# Patient Record
Sex: FEMALE | Race: WHITE
[De-identification: names, ages, dates, MRNs, and addresses within clinical notes are randomized per-mention and may not be internally consistent; named-entity substitution may affect disease eponyms.]

---

## 2017-09-18 ENCOUNTER — HOSPITAL ENCOUNTER (OUTPATIENT)
Dept: HOSPITAL 15 - LAB | Age: 82
Discharge: HOME | End: 2017-09-18
Attending: INTERNAL MEDICINE
Payer: MEDICARE

## 2017-09-18 DIAGNOSIS — I50.9: ICD-10-CM

## 2017-09-18 DIAGNOSIS — E11.8: Primary | ICD-10-CM

## 2017-09-18 DIAGNOSIS — I11.0: ICD-10-CM

## 2017-09-18 LAB
ALBUMIN SERPL-MCNC: 3.2 G/DL (ref 3.4–5)
ALP SERPL-CCNC: 90 U/L (ref 45–117)
ANION GAP SERPL CALCULATED.3IONS-SCNC: 7 MMOL/L (ref 5–15)
BILIRUB SERPL-MCNC: 0.4 MG/DL (ref 0.2–1)
BUN SERPL-MCNC: 19 MG/DL (ref 7–18)
BUN/CREAT SERPL: 18.1
CALCIUM SERPL-MCNC: 8.9 MG/DL (ref 8.5–10.1)
CHLORIDE SERPL-SCNC: 103 MMOL/L (ref 98–107)
CHOLEST SERPL-MCNC: 85 MG/DL (ref ?–200)
CO2 SERPL-SCNC: 29 MMOL/L (ref 21–32)
GLUCOSE SERPL-MCNC: 144 MG/DL (ref 74–106)
HCT VFR BLD AUTO: 35.9 % (ref 36–46)
HDLC SERPL-MCNC: 51 MG/DL (ref 40–59)
HGB BLD-MCNC: 12 G/DL (ref 12.2–16.2)
MCH RBC QN AUTO: 28.1 PG (ref 28–32)
MCV RBC AUTO: 84.3 FL (ref 80–100)
NRBC BLD QL AUTO: 0 %
POTASSIUM SERPL-SCNC: 3.5 MMOL/L (ref 3.5–5.1)
PROT SERPL-MCNC: 7.5 G/DL (ref 6.4–8.2)
SODIUM SERPL-SCNC: 139 MMOL/L (ref 136–145)
TRIGL SERPL-MCNC: 126 MG/DL (ref ?–150)

## 2017-09-18 PROCEDURE — 80061 LIPID PANEL: CPT

## 2017-09-18 PROCEDURE — 80053 COMPREHEN METABOLIC PANEL: CPT

## 2017-09-18 PROCEDURE — 83036 HEMOGLOBIN GLYCOSYLATED A1C: CPT

## 2017-09-18 PROCEDURE — 82043 UR ALBUMIN QUANTITATIVE: CPT

## 2017-09-18 PROCEDURE — 36415 COLL VENOUS BLD VENIPUNCTURE: CPT

## 2017-09-18 PROCEDURE — 81001 URINALYSIS AUTO W/SCOPE: CPT

## 2017-09-18 PROCEDURE — 85025 COMPLETE CBC W/AUTO DIFF WBC: CPT

## 2018-04-18 ENCOUNTER — HOSPITAL ENCOUNTER (OUTPATIENT)
Dept: HOSPITAL 15 - LAB | Age: 83
Discharge: HOME | End: 2018-04-18
Attending: INTERNAL MEDICINE
Payer: MEDICARE

## 2018-04-18 DIAGNOSIS — E11.22: ICD-10-CM

## 2018-04-18 DIAGNOSIS — I12.9: ICD-10-CM

## 2018-04-18 DIAGNOSIS — N18.3: ICD-10-CM

## 2018-04-18 DIAGNOSIS — Z79.899: ICD-10-CM

## 2018-04-18 DIAGNOSIS — E78.5: Primary | ICD-10-CM

## 2018-04-18 LAB
ALBUMIN SERPL-MCNC: 3.2 G/DL (ref 3.4–5)
ALP SERPL-CCNC: 96 U/L (ref 45–117)
ALT SERPL-CCNC: 19 U/L (ref 13–56)
ANION GAP SERPL CALCULATED.3IONS-SCNC: 9 MMOL/L (ref 5–15)
BILIRUB SERPL-MCNC: 0.2 MG/DL (ref 0.2–1)
BUN SERPL-MCNC: 32 MG/DL (ref 7–18)
BUN/CREAT SERPL: 26.9
CALCIUM SERPL-MCNC: 9.3 MG/DL (ref 8.5–10.1)
CHLORIDE SERPL-SCNC: 107 MMOL/L (ref 98–107)
CHOLEST SERPL-MCNC: 134 MG/DL (ref ?–200)
CO2 SERPL-SCNC: 24 MMOL/L (ref 21–32)
GLUCOSE SERPL-MCNC: 131 MG/DL (ref 74–106)
HCT VFR BLD AUTO: 35 % (ref 36–46)
HDLC SERPL-MCNC: 41 MG/DL (ref 40–59)
HGB BLD-MCNC: 11.6 G/DL (ref 12.2–16.2)
MCH RBC QN AUTO: 28.6 PG (ref 28–32)
MCV RBC AUTO: 86.3 FL (ref 80–100)
NRBC BLD QL AUTO: 0 %
POTASSIUM SERPL-SCNC: 3.6 MMOL/L (ref 3.5–5.1)
PROT SERPL-MCNC: 7.5 G/DL (ref 6.4–8.2)
SODIUM SERPL-SCNC: 140 MMOL/L (ref 136–145)
TRIGL SERPL-MCNC: 242 MG/DL (ref ?–150)

## 2018-04-18 PROCEDURE — 84443 ASSAY THYROID STIM HORMONE: CPT

## 2018-04-18 PROCEDURE — 80053 COMPREHEN METABOLIC PANEL: CPT

## 2018-04-18 PROCEDURE — 80061 LIPID PANEL: CPT

## 2018-04-18 PROCEDURE — 82306 VITAMIN D 25 HYDROXY: CPT

## 2018-04-18 PROCEDURE — 83036 HEMOGLOBIN GLYCOSYLATED A1C: CPT

## 2018-04-18 PROCEDURE — 85025 COMPLETE CBC W/AUTO DIFF WBC: CPT

## 2018-04-18 PROCEDURE — 36415 COLL VENOUS BLD VENIPUNCTURE: CPT

## 2018-04-18 PROCEDURE — 81001 URINALYSIS AUTO W/SCOPE: CPT

## 2019-07-17 ENCOUNTER — HOSPITAL ENCOUNTER (OUTPATIENT)
Dept: HOSPITAL 15 - LAB | Age: 84
Discharge: HOME | End: 2019-07-17
Attending: NURSE PRACTITIONER
Payer: MEDICARE

## 2019-07-17 DIAGNOSIS — E11.29: ICD-10-CM

## 2019-07-17 DIAGNOSIS — E78.5: Primary | ICD-10-CM

## 2019-07-17 DIAGNOSIS — I50.9: ICD-10-CM

## 2019-07-17 DIAGNOSIS — I11.0: ICD-10-CM

## 2019-07-17 LAB
ALBUMIN SERPL-MCNC: 3.2 G/DL (ref 3.4–5)
ALP SERPL-CCNC: 83 U/L (ref 45–117)
ALT SERPL-CCNC: 16 U/L (ref 13–56)
ANION GAP SERPL CALCULATED.3IONS-SCNC: 15 MMOL/L (ref 5–15)
BILIRUB SERPL-MCNC: 0.3 MG/DL (ref 0.2–1)
BUN SERPL-MCNC: 24 MG/DL (ref 7–18)
BUN/CREAT SERPL: 19.2
CALCIUM SERPL-MCNC: 7.9 MG/DL (ref 8.5–10.1)
CHLORIDE SERPL-SCNC: 110 MMOL/L (ref 98–107)
CHOLEST SERPL-MCNC: 77 MG/DL (ref ?–200)
CO2 SERPL-SCNC: 16 MMOL/L (ref 21–32)
GLUCOSE SERPL-MCNC: 150 MG/DL (ref 74–106)
HCT VFR BLD AUTO: 35.7 % (ref 36–46)
HDLC SERPL-MCNC: 46 MG/DL (ref 40–59)
HGB BLD-MCNC: 11.9 G/DL (ref 12.2–16.2)
MCH RBC QN AUTO: 28.3 PG (ref 28–32)
MCV RBC AUTO: 84.7 FL (ref 80–100)
NRBC BLD QL AUTO: 0 %
POTASSIUM SERPL-SCNC: 4 MMOL/L (ref 3.5–5.1)
PROT SERPL-MCNC: 7.5 G/DL (ref 6.4–8.2)
SODIUM SERPL-SCNC: 141 MMOL/L (ref 136–145)
TRIGL SERPL-MCNC: 133 MG/DL (ref ?–150)

## 2019-07-17 PROCEDURE — 85025 COMPLETE CBC W/AUTO DIFF WBC: CPT

## 2019-07-17 PROCEDURE — 81001 URINALYSIS AUTO W/SCOPE: CPT

## 2019-07-17 PROCEDURE — 84443 ASSAY THYROID STIM HORMONE: CPT

## 2019-07-17 PROCEDURE — 80053 COMPREHEN METABOLIC PANEL: CPT

## 2019-07-17 PROCEDURE — 36415 COLL VENOUS BLD VENIPUNCTURE: CPT

## 2019-07-17 PROCEDURE — 83036 HEMOGLOBIN GLYCOSYLATED A1C: CPT

## 2019-07-17 PROCEDURE — 82043 UR ALBUMIN QUANTITATIVE: CPT

## 2019-07-17 PROCEDURE — 80061 LIPID PANEL: CPT

## 2020-06-23 ENCOUNTER — HOSPITAL ENCOUNTER (INPATIENT)
Dept: HOSPITAL 15 - ER | Age: 85
LOS: 7 days | Discharge: HOME | DRG: 853 | End: 2020-06-30
Attending: FAMILY MEDICINE | Admitting: NURSE PRACTITIONER
Payer: MEDICARE

## 2020-06-23 VITALS — SYSTOLIC BLOOD PRESSURE: 110 MMHG | DIASTOLIC BLOOD PRESSURE: 62 MMHG

## 2020-06-23 VITALS — SYSTOLIC BLOOD PRESSURE: 128 MMHG | DIASTOLIC BLOOD PRESSURE: 56 MMHG

## 2020-06-23 VITALS — WEIGHT: 203.49 LBS | BODY MASS INDEX: 33.9 KG/M2 | HEIGHT: 65 IN

## 2020-06-23 VITALS — SYSTOLIC BLOOD PRESSURE: 145 MMHG | DIASTOLIC BLOOD PRESSURE: 62 MMHG

## 2020-06-23 DIAGNOSIS — N18.3: ICD-10-CM

## 2020-06-23 DIAGNOSIS — Z90.710: ICD-10-CM

## 2020-06-23 DIAGNOSIS — R65.20: ICD-10-CM

## 2020-06-23 DIAGNOSIS — E66.9: ICD-10-CM

## 2020-06-23 DIAGNOSIS — J18.9: ICD-10-CM

## 2020-06-23 DIAGNOSIS — E86.0: ICD-10-CM

## 2020-06-23 DIAGNOSIS — E11.22: ICD-10-CM

## 2020-06-23 DIAGNOSIS — K80.00: ICD-10-CM

## 2020-06-23 DIAGNOSIS — Z79.84: ICD-10-CM

## 2020-06-23 DIAGNOSIS — Z85.068: ICD-10-CM

## 2020-06-23 DIAGNOSIS — Z80.9: ICD-10-CM

## 2020-06-23 DIAGNOSIS — Z03.818: ICD-10-CM

## 2020-06-23 DIAGNOSIS — A41.59: Primary | ICD-10-CM

## 2020-06-23 DIAGNOSIS — I50.33: ICD-10-CM

## 2020-06-23 DIAGNOSIS — I13.0: ICD-10-CM

## 2020-06-23 DIAGNOSIS — Z85.028: ICD-10-CM

## 2020-06-23 DIAGNOSIS — E78.5: ICD-10-CM

## 2020-06-23 DIAGNOSIS — N17.0: ICD-10-CM

## 2020-06-23 DIAGNOSIS — B96.1: ICD-10-CM

## 2020-06-23 DIAGNOSIS — R19.7: ICD-10-CM

## 2020-06-23 LAB
ALBUMIN SERPL-MCNC: 3.7 G/DL (ref 3.4–5)
ALP SERPL-CCNC: 137 U/L (ref 45–117)
ALT SERPL-CCNC: 187 U/L (ref 13–56)
ANION GAP SERPL CALCULATED.3IONS-SCNC: 14 MMOL/L (ref 5–15)
BILIRUB SERPL-MCNC: 2.7 MG/DL (ref 0.2–1)
BUN SERPL-MCNC: 30 MG/DL (ref 7–18)
BUN/CREAT SERPL: 12.7
CALCIUM SERPL-MCNC: 9.5 MG/DL (ref 8.5–10.1)
CHLORIDE SERPL-SCNC: 103 MMOL/L (ref 98–107)
CO2 SERPL-SCNC: 21 MMOL/L (ref 21–32)
CRYSTALS UR QL AUTO: (no result) /HPF
GLUCOSE SERPL-MCNC: 315 MG/DL (ref 74–106)
HCT VFR BLD AUTO: 41.3 % (ref 36–46)
HGB BLD-MCNC: 13.5 G/DL (ref 12.2–16.2)
LACTATE PLASV-SCNC: 7.6 MMOL/L (ref 0.4–2)
MAGNESIUM SERPL-MCNC: 2.1 MG/DL (ref 1.6–2.6)
MCH RBC QN AUTO: 29.4 PG (ref 28–32)
MCV RBC AUTO: 89.8 FL (ref 80–100)
NRBC BLD QL AUTO: 0 %
POTASSIUM SERPL-SCNC: 3.7 MMOL/L (ref 3.5–5.1)
PROT SERPL-MCNC: 7.8 G/DL (ref 6.4–8.2)
SODIUM SERPL-SCNC: 138 MMOL/L (ref 136–145)

## 2020-06-23 PROCEDURE — 87804 INFLUENZA ASSAY W/OPTIC: CPT

## 2020-06-23 PROCEDURE — 83735 ASSAY OF MAGNESIUM: CPT

## 2020-06-23 PROCEDURE — 86901 BLOOD TYPING SEROLOGIC RH(D): CPT

## 2020-06-23 PROCEDURE — 36415 COLL VENOUS BLD VENIPUNCTURE: CPT

## 2020-06-23 PROCEDURE — 87070 CULTURE OTHR SPECIMN AEROBIC: CPT

## 2020-06-23 PROCEDURE — 83036 HEMOGLOBIN GLYCOSYLATED A1C: CPT

## 2020-06-23 PROCEDURE — 86850 RBC ANTIBODY SCREEN: CPT

## 2020-06-23 PROCEDURE — 82247 BILIRUBIN TOTAL: CPT

## 2020-06-23 PROCEDURE — 87040 BLOOD CULTURE FOR BACTERIA: CPT

## 2020-06-23 PROCEDURE — 76700 US EXAM ABDOM COMPLETE: CPT

## 2020-06-23 PROCEDURE — 87045 FECES CULTURE AEROBIC BACT: CPT

## 2020-06-23 PROCEDURE — 82962 GLUCOSE BLOOD TEST: CPT

## 2020-06-23 PROCEDURE — 86704 HEP B CORE ANTIBODY TOTAL: CPT

## 2020-06-23 PROCEDURE — 93970 EXTREMITY STUDY: CPT

## 2020-06-23 PROCEDURE — 86900 BLOOD TYPING SEROLOGIC ABO: CPT

## 2020-06-23 PROCEDURE — 96375 TX/PRO/DX INJ NEW DRUG ADDON: CPT

## 2020-06-23 PROCEDURE — 81001 URINALYSIS AUTO W/SCOPE: CPT

## 2020-06-23 PROCEDURE — 83970 ASSAY OF PARATHORMONE: CPT

## 2020-06-23 PROCEDURE — 85379 FIBRIN DEGRADATION QUANT: CPT

## 2020-06-23 PROCEDURE — 94002 VENT MGMT INPAT INIT DAY: CPT

## 2020-06-23 PROCEDURE — 85730 THROMBOPLASTIN TIME PARTIAL: CPT

## 2020-06-23 PROCEDURE — 82728 ASSAY OF FERRITIN: CPT

## 2020-06-23 PROCEDURE — 78582 LUNG VENTILAT&PERFUS IMAGING: CPT

## 2020-06-23 PROCEDURE — 87880 STREP A ASSAY W/OPTIC: CPT

## 2020-06-23 PROCEDURE — 87186 SC STD MICRODIL/AGAR DIL: CPT

## 2020-06-23 PROCEDURE — 87493 C DIFF AMPLIFIED PROBE: CPT

## 2020-06-23 PROCEDURE — 87077 CULTURE AEROBIC IDENTIFY: CPT

## 2020-06-23 PROCEDURE — 80048 BASIC METABOLIC PNL TOTAL CA: CPT

## 2020-06-23 PROCEDURE — 84100 ASSAY OF PHOSPHORUS: CPT

## 2020-06-23 PROCEDURE — 78226 HEPATOBILIARY SYSTEM IMAGING: CPT

## 2020-06-23 PROCEDURE — 71045 X-RAY EXAM CHEST 1 VIEW: CPT

## 2020-06-23 PROCEDURE — 93306 TTE W/DOPPLER COMPLETE: CPT

## 2020-06-23 PROCEDURE — 84443 ASSAY THYROID STIM HORMONE: CPT

## 2020-06-23 PROCEDURE — 82306 VITAMIN D 25 HYDROXY: CPT

## 2020-06-23 PROCEDURE — 85025 COMPLETE CBC W/AUTO DIFF WBC: CPT

## 2020-06-23 PROCEDURE — 93005 ELECTROCARDIOGRAM TRACING: CPT

## 2020-06-23 PROCEDURE — 96365 THER/PROPH/DIAG IV INF INIT: CPT

## 2020-06-23 PROCEDURE — 87340 HEPATITIS B SURFACE AG IA: CPT

## 2020-06-23 PROCEDURE — 86708 HEPATITIS A ANTIBODY: CPT

## 2020-06-23 PROCEDURE — 86141 C-REACTIVE PROTEIN HS: CPT

## 2020-06-23 PROCEDURE — 83615 LACTATE (LD) (LDH) ENZYME: CPT

## 2020-06-23 PROCEDURE — 83605 ASSAY OF LACTIC ACID: CPT

## 2020-06-23 PROCEDURE — 82010 KETONE BODYS QUAN: CPT

## 2020-06-23 PROCEDURE — 86803 HEPATITIS C AB TEST: CPT

## 2020-06-23 PROCEDURE — 85610 PROTHROMBIN TIME: CPT

## 2020-06-23 PROCEDURE — 83880 ASSAY OF NATRIURETIC PEPTIDE: CPT

## 2020-06-23 PROCEDURE — 80053 COMPREHEN METABOLIC PANEL: CPT

## 2020-06-23 PROCEDURE — 82570 ASSAY OF URINE CREATININE: CPT

## 2020-06-23 PROCEDURE — 99291 CRITICAL CARE FIRST HOUR: CPT

## 2020-06-23 PROCEDURE — 87427 SHIGA-LIKE TOXIN AG IA: CPT

## 2020-06-23 PROCEDURE — 84300 ASSAY OF URINE SODIUM: CPT

## 2020-06-23 PROCEDURE — 87086 URINE CULTURE/COLONY COUNT: CPT

## 2020-06-23 PROCEDURE — 86706 HEP B SURFACE ANTIBODY: CPT

## 2020-06-23 RX ADMIN — VITAMIN D, TAB 1000IU (100/BT) SCH UNIT: 25 TAB at 12:29

## 2020-06-23 RX ADMIN — ENOXAPARIN SODIUM SCH MG: 80 INJECTION SUBCUTANEOUS at 21:46

## 2020-06-23 RX ADMIN — SODIUM CHLORIDE SCH MLS/HR: 0.9 INJECTION, SOLUTION INTRAVENOUS at 12:28

## 2020-06-23 RX ADMIN — Medication SCH MG: at 12:43

## 2020-06-23 RX ADMIN — HUMAN INSULIN SCH UNITS: 100 INJECTION, SOLUTION SUBCUTANEOUS at 12:38

## 2020-06-23 RX ADMIN — Medication SCH STRIP: at 12:43

## 2020-06-23 RX ADMIN — ZINC SULFATE CAP 220 MG (50 MG ELEMENTAL ZN) SCH MG: 220 (50 ZN) CAP at 12:29

## 2020-06-23 RX ADMIN — HUMAN INSULIN SCH UNITS: 100 INJECTION, SOLUTION SUBCUTANEOUS at 17:00

## 2020-06-23 RX ADMIN — SODIUM CHLORIDE SCH MLS/HR: 0.9 INJECTION, SOLUTION INTRAVENOUS at 22:35

## 2020-06-23 RX ADMIN — Medication SCH STRIP: at 17:00

## 2020-06-23 RX ADMIN — Medication SCH STRIP: at 22:00

## 2020-06-23 RX ADMIN — HUMAN INSULIN SCH UNITS: 100 INJECTION, SOLUTION SUBCUTANEOUS at 23:19

## 2020-06-23 RX ADMIN — ENOXAPARIN SODIUM SCH MG: 80 INJECTION SUBCUTANEOUS at 12:29

## 2020-06-23 NOTE — NUR
Opening Shift Note

Assumed care of patient, awake and alert.  No S/S of distress/SOB or pain.  Instructed on 
POC and to call for assist PRN, will continue to monitor for changes Q1hr and PRN. fall 
precautions in place.

## 2020-06-23 NOTE — NUR
Resting on bed, not in distress, head of bed elevated, bed on low position, rails up x2, 
call light on reach, report was given to the night shift RN.

## 2020-06-23 NOTE — NUR
NOTIFIED NP CARR OF BLOOD CULTURE RESULTS. NO NEW ORDERS RECEIVED ORDERS READ BACK AND 
VERIFIED BY SUYAPA CARR

## 2020-06-23 NOTE — NUR
CAME ON GURNEY FROM ER, RECEIVED PATIENT ALERT AND ORIENTED X4,  NOT IN DISTRESS, CLEAR LS 
IN BILATERAL UPPER AND LOWER LOBES NOTED, RR=18 SAT= 96% IN RA, DEEP BREATHING AND COUGHING 
ENCOURAGED, DEMONSTRATED AND VERBALIZED UNDERSTANDING, DENIED SOB AND CHEST PAIN AT THIS 
MOMENT, SR R=87 ON TELE MONITOR, ABDOMEN SOFT WITH ACTIVE BS, LAST BM=6/22/20 AS REPORTED, 
SKIN INTACT WARM  TOUCH, RADIAL AND PEDAL PULSES PALPABLE,  LT. SHOULDER WEAKNESS NOTED,  
RESTING ON BED, T=98.4 RR=18 SAT=96% P=78 MK=711/62, HEAD OF BED ELEVATED, BED ON LOW 
POSITION, RAILS UP X2, CALL LIGHT ON REACH, WILL CONTINUE MONITORING.

## 2020-06-24 VITALS — SYSTOLIC BLOOD PRESSURE: 123 MMHG | DIASTOLIC BLOOD PRESSURE: 80 MMHG

## 2020-06-24 VITALS — DIASTOLIC BLOOD PRESSURE: 54 MMHG | SYSTOLIC BLOOD PRESSURE: 116 MMHG

## 2020-06-24 VITALS — SYSTOLIC BLOOD PRESSURE: 146 MMHG | DIASTOLIC BLOOD PRESSURE: 56 MMHG

## 2020-06-24 VITALS — DIASTOLIC BLOOD PRESSURE: 58 MMHG | SYSTOLIC BLOOD PRESSURE: 127 MMHG

## 2020-06-24 LAB
ALBUMIN SERPL-MCNC: 2.9 G/DL (ref 3.4–5)
ALP SERPL-CCNC: 132 U/L (ref 45–117)
ALT SERPL-CCNC: 124 U/L (ref 13–56)
ANION GAP SERPL CALCULATED.3IONS-SCNC: 9 MMOL/L (ref 5–15)
BILIRUB SERPL-MCNC: 1.9 MG/DL (ref 0.2–1)
BUN SERPL-MCNC: 47 MG/DL (ref 7–18)
BUN/CREAT SERPL: 18.1
CALCIUM SERPL-MCNC: 8 MG/DL (ref 8.5–10.1)
CHLORIDE SERPL-SCNC: 106 MMOL/L (ref 98–107)
CO2 SERPL-SCNC: 22 MMOL/L (ref 21–32)
GLUCOSE SERPL-MCNC: 154 MG/DL (ref 74–106)
HCT VFR BLD AUTO: 35.7 % (ref 36–46)
HGB BLD-MCNC: 11.8 G/DL (ref 12.2–16.2)
MCH RBC QN AUTO: 29.1 PG (ref 28–32)
MCV RBC AUTO: 88 FL (ref 80–100)
NRBC BLD QL AUTO: 0 %
POTASSIUM SERPL-SCNC: 3.8 MMOL/L (ref 3.5–5.1)
PROT SERPL-MCNC: 7 G/DL (ref 6.4–8.2)
SODIUM SERPL-SCNC: 137 MMOL/L (ref 136–145)

## 2020-06-24 RX ADMIN — HUMAN INSULIN SCH UNITS: 100 INJECTION, SOLUTION SUBCUTANEOUS at 06:34

## 2020-06-24 RX ADMIN — Medication SCH STRIP: at 06:33

## 2020-06-24 RX ADMIN — Medication SCH STRIP: at 11:30

## 2020-06-24 RX ADMIN — AZITHROMYCIN DIHYDRATE SCH MLS/HR: 500 INJECTION, POWDER, LYOPHILIZED, FOR SOLUTION INTRAVENOUS at 10:17

## 2020-06-24 RX ADMIN — VITAMIN D, TAB 1000IU (100/BT) SCH UNIT: 25 TAB at 10:18

## 2020-06-24 RX ADMIN — SODIUM CHLORIDE SCH MLS/HR: 0.9 INJECTION, SOLUTION INTRAVENOUS at 15:01

## 2020-06-24 RX ADMIN — Medication SCH MG: at 10:18

## 2020-06-24 RX ADMIN — ZINC SULFATE CAP 220 MG (50 MG ELEMENTAL ZN) SCH MG: 220 (50 ZN) CAP at 10:18

## 2020-06-24 RX ADMIN — ENOXAPARIN SODIUM SCH MG: 80 INJECTION SUBCUTANEOUS at 10:18

## 2020-06-24 RX ADMIN — Medication SCH STRIP: at 22:02

## 2020-06-24 RX ADMIN — ENOXAPARIN SODIUM SCH MG: 80 INJECTION SUBCUTANEOUS at 21:36

## 2020-06-24 RX ADMIN — HUMAN INSULIN SCH UNITS: 100 INJECTION, SOLUTION SUBCUTANEOUS at 11:30

## 2020-06-24 RX ADMIN — HUMAN INSULIN SCH UNITS: 100 INJECTION, SOLUTION SUBCUTANEOUS at 22:03

## 2020-06-24 NOTE — NUR
RECEIVED PATIENT ALERT AND ORIENTED X4, NOT IN DISTRESS, CLEAR LS IN BILATERAL UPPER AND  
LOWER LOBES NOTED, RR=16 SAT= 96% WITH O2 2L NC, DEEP BREATHING AND COUGHING ENCOURAGED, 
VERBALIZED UNDERSTANDING, DENIED SOB AND CHEST PAIN AT THIS MOMENT, SR R=71 ON TELE MONITOR, 
ABDOMEN SOFT WITH ACTIVE BS, LAST BM=6/22/20 AS REPORTED, RADIAL AND PEDAL PULSES PALPABLE, 
DENIED PAIN, RESTING ON BED, HEAD OF BED ELEVATED, BED ON LOW POSITION, RAILS UP X2, CALL 
LIGHT ON REACH, PENDING VQ SCAN, WILL CONTINUE MONITORING.

## 2020-06-24 NOTE — NUR
Opening Shift Note

Assumed care of patient, awake and alert.  No S/S of distress/SOB or pain.  Instructed on 
POC and to call for assist PRN, will continue to monitor for changes Q1hr and PRN. bed in 
low position and call light within reach.

## 2020-06-25 VITALS — SYSTOLIC BLOOD PRESSURE: 142 MMHG | DIASTOLIC BLOOD PRESSURE: 57 MMHG

## 2020-06-25 VITALS — SYSTOLIC BLOOD PRESSURE: 143 MMHG | DIASTOLIC BLOOD PRESSURE: 55 MMHG

## 2020-06-25 VITALS — DIASTOLIC BLOOD PRESSURE: 73 MMHG | SYSTOLIC BLOOD PRESSURE: 150 MMHG

## 2020-06-25 VITALS — DIASTOLIC BLOOD PRESSURE: 67 MMHG | SYSTOLIC BLOOD PRESSURE: 148 MMHG

## 2020-06-25 VITALS — DIASTOLIC BLOOD PRESSURE: 64 MMHG | SYSTOLIC BLOOD PRESSURE: 146 MMHG

## 2020-06-25 LAB
ALBUMIN SERPL-MCNC: 2.7 G/DL (ref 3.4–5)
ALP SERPL-CCNC: 112 U/L (ref 45–117)
ALT SERPL-CCNC: 79 U/L (ref 13–56)
ANION GAP SERPL CALCULATED.3IONS-SCNC: 8 MMOL/L (ref 5–15)
APTT PPP: 35.5 SEC (ref 23.64–32.05)
BILIRUB SERPL-MCNC: 0.7 MG/DL (ref 0.2–1)
BUN SERPL-MCNC: 42 MG/DL (ref 7–18)
BUN/CREAT SERPL: 23.5
CALCIUM SERPL-MCNC: 8 MG/DL (ref 8.5–10.1)
CHLORIDE SERPL-SCNC: 105 MMOL/L (ref 98–107)
CO2 SERPL-SCNC: 22 MMOL/L (ref 21–32)
GLUCOSE SERPL-MCNC: 138 MG/DL (ref 74–106)
HCT VFR BLD AUTO: 35 % (ref 36–46)
HGB BLD-MCNC: 11.6 G/DL (ref 12.2–16.2)
INR PPP: 1.04 (ref 0.9–1.15)
MAGNESIUM SERPL-MCNC: 2.2 MG/DL (ref 1.6–2.6)
MCH RBC QN AUTO: 29.5 PG (ref 28–32)
MCV RBC AUTO: 88.7 FL (ref 80–100)
NRBC BLD QL AUTO: 0 %
POTASSIUM SERPL-SCNC: 3.7 MMOL/L (ref 3.5–5.1)
PROT SERPL-MCNC: 6.9 G/DL (ref 6.4–8.2)
SODIUM SERPL-SCNC: 135 MMOL/L (ref 136–145)

## 2020-06-25 RX ADMIN — AZITHROMYCIN DIHYDRATE SCH MLS/HR: 500 INJECTION, POWDER, LYOPHILIZED, FOR SOLUTION INTRAVENOUS at 09:25

## 2020-06-25 RX ADMIN — Medication SCH STRIP: at 21:53

## 2020-06-25 RX ADMIN — HUMAN INSULIN SCH UNITS: 100 INJECTION, SOLUTION SUBCUTANEOUS at 21:53

## 2020-06-25 RX ADMIN — MEROPENEM SCH MLS/HR: 1 INJECTION, POWDER, FOR SOLUTION INTRAVENOUS at 14:23

## 2020-06-25 RX ADMIN — SODIUM CHLORIDE SCH MLS/HR: 0.9 INJECTION, SOLUTION INTRAVENOUS at 01:06

## 2020-06-25 RX ADMIN — Medication SCH STRIP: at 13:38

## 2020-06-25 RX ADMIN — Medication SCH STRIP: at 06:27

## 2020-06-25 RX ADMIN — HUMAN INSULIN SCH UNITS: 100 INJECTION, SOLUTION SUBCUTANEOUS at 06:27

## 2020-06-25 RX ADMIN — HUMAN INSULIN SCH UNITS: 100 INJECTION, SOLUTION SUBCUTANEOUS at 11:30

## 2020-06-25 RX ADMIN — SODIUM CHLORIDE SCH MLS/HR: 0.9 INJECTION, SOLUTION INTRAVENOUS at 16:00

## 2020-06-25 RX ADMIN — FUROSEMIDE SCH MG: 10 INJECTION, SOLUTION INTRAMUSCULAR; INTRAVENOUS at 18:53

## 2020-06-25 RX ADMIN — Medication SCH STRIP: at 18:26

## 2020-06-25 RX ADMIN — HUMAN INSULIN SCH UNITS: 100 INJECTION, SOLUTION SUBCUTANEOUS at 17:00

## 2020-06-25 NOTE — NUR
SPOKE TO SUYAPA VEGA REGARDING CARDIAC CLEARANCE. 

NEW ORDERS RECEIVED, READ BACK AND VERIFIED. SEE EMR FOR ORDERS.

## 2020-06-25 NOTE — NUR
SPOKE TO SUYAPA VEGA.

ACCORDING TO SUYAPA VEGA, THE PATIENT IS CLEAR FOR SURGERY AT MODERATE RISK. WILL NOTIFY 
PRE-OP.

## 2020-06-25 NOTE — NUR
SPOKE TO DR. MITCHELL.

RN UPDATED DR. MITCHELL ON THE PATIENTS DIET. PER DR. MITCHELL, PATIENT IS TO REMAIN NPO.

## 2020-06-25 NOTE — NUR
Opening Shift Note

Assumed care of patient, awake and alert. No S/S of distress/SOB. Pt denies having any pain 
at this time. Bed in lowest and locked position with side rails up x2 and call light in 
reach. Instructed on POC and to call for assist PRN, will continue to monitor for changes 
Q1hr and PRN.

## 2020-06-25 NOTE — NUR
REPORT GIVEN TO DAYSHIFT RN PATIENT IS AWAKE AND ALERT DENIES SOB DISTRESS OR PAIN. FALL 
PRECAUTIONS IN PLACE. IV PATENT AND ASYMPTOMATIC

## 2020-06-26 VITALS — SYSTOLIC BLOOD PRESSURE: 108 MMHG | DIASTOLIC BLOOD PRESSURE: 52 MMHG

## 2020-06-26 VITALS — SYSTOLIC BLOOD PRESSURE: 141 MMHG | DIASTOLIC BLOOD PRESSURE: 58 MMHG

## 2020-06-26 VITALS — DIASTOLIC BLOOD PRESSURE: 98 MMHG | SYSTOLIC BLOOD PRESSURE: 145 MMHG

## 2020-06-26 VITALS — SYSTOLIC BLOOD PRESSURE: 145 MMHG | DIASTOLIC BLOOD PRESSURE: 66 MMHG

## 2020-06-26 VITALS — SYSTOLIC BLOOD PRESSURE: 142 MMHG | DIASTOLIC BLOOD PRESSURE: 61 MMHG

## 2020-06-26 LAB
ALBUMIN SERPL-MCNC: 2.4 G/DL (ref 3.4–5)
ALP SERPL-CCNC: 98 U/L (ref 45–117)
ALT SERPL-CCNC: 45 U/L (ref 13–56)
ANION GAP SERPL CALCULATED.3IONS-SCNC: 10 MMOL/L (ref 5–15)
BILIRUB SERPL-MCNC: 0.6 MG/DL (ref 0.2–1)
BUN SERPL-MCNC: 34 MG/DL (ref 7–18)
BUN/CREAT SERPL: 24.1
CALCIUM SERPL-MCNC: 7.8 MG/DL (ref 8.5–10.1)
CHLORIDE SERPL-SCNC: 101 MMOL/L (ref 98–107)
CO2 SERPL-SCNC: 24 MMOL/L (ref 21–32)
GLUCOSE SERPL-MCNC: 242 MG/DL (ref 74–106)
HCT VFR BLD AUTO: 34.8 % (ref 36–46)
HGB BLD-MCNC: 11.6 G/DL (ref 12.2–16.2)
MCH RBC QN AUTO: 29.2 PG (ref 28–32)
MCV RBC AUTO: 87.7 FL (ref 80–100)
NRBC BLD QL AUTO: 0 %
POTASSIUM SERPL-SCNC: 3.8 MMOL/L (ref 3.5–5.1)
PROT SERPL-MCNC: 6.6 G/DL (ref 6.4–8.2)
SODIUM SERPL-SCNC: 135 MMOL/L (ref 136–145)

## 2020-06-26 PROCEDURE — 0FT40ZZ RESECTION OF GALLBLADDER, OPEN APPROACH: ICD-10-PCS | Performed by: SURGERY

## 2020-06-26 RX ADMIN — Medication SCH STRIP: at 17:37

## 2020-06-26 RX ADMIN — MEROPENEM SCH MLS/HR: 1 INJECTION, POWDER, FOR SOLUTION INTRAVENOUS at 01:52

## 2020-06-26 RX ADMIN — HUMAN INSULIN SCH UNITS: 100 INJECTION, SOLUTION SUBCUTANEOUS at 21:18

## 2020-06-26 RX ADMIN — FUROSEMIDE SCH MG: 10 INJECTION, SOLUTION INTRAMUSCULAR; INTRAVENOUS at 06:12

## 2020-06-26 RX ADMIN — MEROPENEM SCH MLS/HR: 1 INJECTION, POWDER, FOR SOLUTION INTRAVENOUS at 14:01

## 2020-06-26 RX ADMIN — Medication SCH STRIP: at 06:08

## 2020-06-26 RX ADMIN — HUMAN INSULIN SCH UNITS: 100 INJECTION, SOLUTION SUBCUTANEOUS at 17:00

## 2020-06-26 RX ADMIN — AZITHROMYCIN DIHYDRATE SCH MLS/HR: 500 INJECTION, POWDER, LYOPHILIZED, FOR SOLUTION INTRAVENOUS at 10:00

## 2020-06-26 RX ADMIN — HUMAN INSULIN SCH UNITS: 100 INJECTION, SOLUTION SUBCUTANEOUS at 11:30

## 2020-06-26 RX ADMIN — Medication SCH STRIP: at 11:30

## 2020-06-26 RX ADMIN — Medication SCH STRIP: at 21:15

## 2020-06-26 RX ADMIN — SODIUM CHLORIDE SCH MLS/HR: 0.9 INJECTION, SOLUTION INTRAVENOUS at 03:55

## 2020-06-26 RX ADMIN — HUMAN INSULIN SCH UNITS: 100 INJECTION, SOLUTION SUBCUTANEOUS at 06:08

## 2020-06-26 NOTE — NUR
assessment

Patient is a 84 year old female who was in a procedure earlier. Per patients son Dimas 
[phone] who is POA prior to admission patient lived home with her  [place] and was 
independent. Patient still drives and is very active. Patients past medical history includes 
type2 diabetes. Patient has very good family support. Per Dimas patient was having diarrhea 
and feeling very weak  so 911 was called. Patient was admitted and is currently being worked 
up for Sepsis and Pneumonia. I informed Dimas patient may benefit from home health for safety 
and med management on discharge. I informed Dimas I will continue to monitor and follow up as 
appropriate. Dimas verbalized understanding and agreed to discharge plan home. 


-------------------------------------------------------------------------------

Addendum: 06/26/20 at 1440 by Monica TOM

-------------------------------------------------------------------------------

Amended: Links added.

## 2020-06-26 NOTE — NUR
Nutrition Assessment Notes



please see attached link for complete assessment



Est Energy needs ABW 73 [id number] kcals (23-25 kcal/kgBW), Est Protein needs: 58-73 
gms/day (0.8-1.0 gm/kgBW r/t elev RFT). Will continue to monitor and reassess prn.




-------------------------------------------------------------------------------

Addendum: 20 at 1510 by Chandrika Gentile RD

-------------------------------------------------------------------------------

Amended: Links added.

## 2020-06-26 NOTE — NUR
RECEIVED PATIENT FROM OR AFTER REPORT RECEIVED FROM [facility]. 

PATIENT RESTING IN BED COMFORTABLY. PATIENT PLACED ON 5 LITERS OXYGEN VIA OXYMIZER. PATIENT 
SATURATING 100% O2. VITALS ARE STABLE AND NO S/S OF SOB OR DISTRESS. BED IN LOWEST AND 
LOCKED POSITION WITH SIDE RAILS UP X2 AND CALL LIGHT IN REACH. WILL CONTINUE TO MONITOR 
PATIENT.

## 2020-06-26 NOTE — NUR
Patient extubated by RT

Extubation order received by Dr. CALDERON, RT at bedside. Patient extubated with no problems, 
patient tolerated well. Patient placed on 50% cool mist mask.  Sats prior to extubation 
100%, following extubation 92%. Continue to monitor.

## 2020-06-26 NOTE — NUR
PATIENT IS MORE ALERT AND ORIENTED X4. PATIENT IS SITTING IN BED WATCHING TV AND EATING 
DINNER. NO S/S OF SOB OR DISTRESS AT THIS TIME. PATIENT IS SATURATING APPROXIMATELY AT 95% 
ON 2 LITERS OXYMIZER AT THIS TIME.

## 2020-06-26 NOTE — NUR
Opening Shift Note

Assumed care of patient, awake and alert.  No S/S of distress/SOB, C/O SORENESS ON INCISION 
SITE (RUQ ABDOMEN), ICE PACK APPLIED.   Instructed on POC and to call for assist PRN, will 
continue to monitor for changes Q1hr and PRN.

## 2020-06-27 VITALS — DIASTOLIC BLOOD PRESSURE: 65 MMHG | SYSTOLIC BLOOD PRESSURE: 139 MMHG

## 2020-06-27 VITALS — DIASTOLIC BLOOD PRESSURE: 73 MMHG | SYSTOLIC BLOOD PRESSURE: 148 MMHG

## 2020-06-27 VITALS — DIASTOLIC BLOOD PRESSURE: 57 MMHG | SYSTOLIC BLOOD PRESSURE: 135 MMHG

## 2020-06-27 VITALS — SYSTOLIC BLOOD PRESSURE: 138 MMHG | DIASTOLIC BLOOD PRESSURE: 62 MMHG

## 2020-06-27 VITALS — DIASTOLIC BLOOD PRESSURE: 40 MMHG | SYSTOLIC BLOOD PRESSURE: 122 MMHG

## 2020-06-27 LAB
ANION GAP SERPL CALCULATED.3IONS-SCNC: 9 MMOL/L (ref 5–15)
BUN SERPL-MCNC: 41 MG/DL (ref 7–18)
BUN/CREAT SERPL: 32
CALCIUM SERPL-MCNC: 8.2 MG/DL (ref 8.5–10.1)
CHLORIDE SERPL-SCNC: 102 MMOL/L (ref 98–107)
CO2 SERPL-SCNC: 26 MMOL/L (ref 21–32)
GLUCOSE SERPL-MCNC: 182 MG/DL (ref 74–106)
POTASSIUM SERPL-SCNC: 4 MMOL/L (ref 3.5–5.1)
SODIUM SERPL-SCNC: 137 MMOL/L (ref 136–145)

## 2020-06-27 RX ADMIN — Medication SCH STRIP: at 06:45

## 2020-06-27 RX ADMIN — HUMAN INSULIN SCH UNITS: 100 INJECTION, SOLUTION SUBCUTANEOUS at 16:06

## 2020-06-27 RX ADMIN — HUMAN INSULIN SCH UNITS: 100 INJECTION, SOLUTION SUBCUTANEOUS at 11:41

## 2020-06-27 RX ADMIN — Medication SCH STRIP: at 11:40

## 2020-06-27 RX ADMIN — MEROPENEM SCH MLS/HR: 1 INJECTION, POWDER, FOR SOLUTION INTRAVENOUS at 02:08

## 2020-06-27 RX ADMIN — MORPHINE SULFATE PRN MG: 2 INJECTION, SOLUTION INTRAMUSCULAR; INTRAVENOUS at 16:12

## 2020-06-27 RX ADMIN — HUMAN INSULIN SCH UNITS: 100 INJECTION, SOLUTION SUBCUTANEOUS at 06:47

## 2020-06-27 RX ADMIN — Medication SCH STRIP: at 16:05

## 2020-06-27 RX ADMIN — Medication SCH STRIP: at 23:01

## 2020-06-27 RX ADMIN — AZITHROMYCIN DIHYDRATE SCH MLS/HR: 500 INJECTION, POWDER, LYOPHILIZED, FOR SOLUTION INTRAVENOUS at 08:54

## 2020-06-27 RX ADMIN — HUMAN INSULIN SCH UNITS: 100 INJECTION, SOLUTION SUBCUTANEOUS at 23:06

## 2020-06-27 RX ADMIN — MEROPENEM SCH MLS/HR: 1 INJECTION, POWDER, FOR SOLUTION INTRAVENOUS at 14:31

## 2020-06-27 NOTE — NUR
Patient pulled one of her IV from her Left FA. Patient was trying to accommodate the 
blankets on the bed. Patient is growing a little bit confuse and more forgetful. Call light 
with in reach, bed placed on lowest position, bed alarm on for safety, frequent rounding. 

-------------------------------------------------------------------------------

Addendum: 06/28/20 at 0133 by ZACHARY AGOSTO RN

-------------------------------------------------------------------------------

chart on the wrong patient.

## 2020-06-27 NOTE — NUR
PAGED MD

PAGER DR DESEAN GANDHI RE: PATIENT C/O 6/10 PAIN TO ABDOMEN. PATIENT S/P OPEN CHOLECYSTECTOMY. 
PATIENT HAS NO PAIN MEDS SCHEDULED. NEW ORDERS RECEIVED/WILL CARRY OUT. WILL CONTINUE TO 
MONITOR

## 2020-06-27 NOTE — NUR
MD ROUNDS

DR DESEAN GANDHI DISCUSSING POC WITH PATIENT. NEW ORDERS RECEIVED/ WILL CARRY OUT. WILL CONTINUE 
TO MONITOR

## 2020-06-27 NOTE — NUR
Opening Shift Note

Assumed care of patient, awake, alert, and oriented.  No S/S of distress/SOB or pain. Bed in 
lowest/locked position, bed rails upx2, call light within reach. Instructed on POC and to 
call for assist PRN. Will continue to monitor for changes Q1hr and PRN.

## 2020-06-27 NOTE — NUR
Opening Shift Note

Assumed care of patient, awake and alert.  No S/S of distress/SOB or pain.  Instructed on 
POC and to call for assist as needed, will continue to monitor.

## 2020-06-28 VITALS — SYSTOLIC BLOOD PRESSURE: 139 MMHG | DIASTOLIC BLOOD PRESSURE: 65 MMHG

## 2020-06-28 VITALS — SYSTOLIC BLOOD PRESSURE: 120 MMHG | DIASTOLIC BLOOD PRESSURE: 56 MMHG

## 2020-06-28 VITALS — DIASTOLIC BLOOD PRESSURE: 58 MMHG | SYSTOLIC BLOOD PRESSURE: 136 MMHG

## 2020-06-28 VITALS — SYSTOLIC BLOOD PRESSURE: 137 MMHG | DIASTOLIC BLOOD PRESSURE: 59 MMHG

## 2020-06-28 VITALS — SYSTOLIC BLOOD PRESSURE: 152 MMHG | DIASTOLIC BLOOD PRESSURE: 66 MMHG

## 2020-06-28 LAB
ANION GAP SERPL CALCULATED.3IONS-SCNC: 8 MMOL/L (ref 5–15)
BUN SERPL-MCNC: 45 MG/DL (ref 7–18)
BUN/CREAT SERPL: 42.1
CALCIUM SERPL-MCNC: 8.4 MG/DL (ref 8.5–10.1)
CHLORIDE SERPL-SCNC: 105 MMOL/L (ref 98–107)
CO2 SERPL-SCNC: 26 MMOL/L (ref 21–32)
GLUCOSE SERPL-MCNC: 130 MG/DL (ref 74–106)
POTASSIUM SERPL-SCNC: 3.9 MMOL/L (ref 3.5–5.1)
SODIUM SERPL-SCNC: 139 MMOL/L (ref 136–145)

## 2020-06-28 RX ADMIN — HUMAN INSULIN SCH UNITS: 100 INJECTION, SOLUTION SUBCUTANEOUS at 11:31

## 2020-06-28 RX ADMIN — Medication SCH STRIP: at 11:30

## 2020-06-28 RX ADMIN — AZITHROMYCIN DIHYDRATE SCH MLS/HR: 500 INJECTION, POWDER, LYOPHILIZED, FOR SOLUTION INTRAVENOUS at 09:23

## 2020-06-28 RX ADMIN — HYDROCODONE BITARTRATE AND ACETAMINOPHEN PRN TAB: 5; 325 TABLET ORAL at 19:57

## 2020-06-28 RX ADMIN — MEROPENEM SCH MLS/HR: 1 INJECTION, POWDER, FOR SOLUTION INTRAVENOUS at 15:07

## 2020-06-28 RX ADMIN — HUMAN INSULIN SCH UNITS: 100 INJECTION, SOLUTION SUBCUTANEOUS at 06:34

## 2020-06-28 RX ADMIN — Medication SCH STRIP: at 06:34

## 2020-06-28 RX ADMIN — HUMAN INSULIN SCH UNITS: 100 INJECTION, SOLUTION SUBCUTANEOUS at 22:54

## 2020-06-28 RX ADMIN — MORPHINE SULFATE PRN MG: 2 INJECTION, SOLUTION INTRAMUSCULAR; INTRAVENOUS at 00:46

## 2020-06-28 RX ADMIN — Medication SCH STRIP: at 17:00

## 2020-06-28 RX ADMIN — Medication SCH STRIP: at 22:55

## 2020-06-28 RX ADMIN — HUMAN INSULIN SCH UNITS: 100 INJECTION, SOLUTION SUBCUTANEOUS at 17:00

## 2020-06-28 RX ADMIN — MEROPENEM SCH MLS/HR: 1 INJECTION, POWDER, FOR SOLUTION INTRAVENOUS at 03:11

## 2020-06-28 NOTE — NUR
IV insertion

IV access obtained, via clean sterile technique by inserting  gauge 22 catheter at  right 
forearm after 1 attempt. IV secured properly. No trauma to site. Patient tolerated well.

## 2020-06-28 NOTE — NUR
IV removal

IV DC'd with clean sterile technique, catheter fully intact. Pressure dressing applied to 
site. Patient tolerated well.

NOTE:

## 2020-06-28 NOTE — NUR
Opening Shift Note

Assumed care of patient, awake and alert.  No S/S of distress/SOB .  Instructed on POC and 
to call for assist PRN, will continue to monitor.

## 2020-06-28 NOTE — NUR
PHYSICAL THERAPY

THERAPY AMBULATING PATIENT IN HALLWAYS. PATIENT IN CHAIR. NO S/S OF DISTRESS, SOB, NO C/O 
PAIN. WILL CONTINUE TO MONITOR

## 2020-06-28 NOTE — NUR
MD ROUNDS

DR DESEAN GANDHI AT BEDSIDE DISCUSSING POC WITH PATIENT. NEW ORDERS RECEIVED/WILL CARRY OUT. WILL 
CONTINUE TO MONITOR

## 2020-06-28 NOTE — NUR
Nutrition Followup Note 



Wt 92.3kg



Pt was with physical therapy at time of rounds.  Pt is s/p williams.  Per MD diet to advance as 
tolerated. Pt with soft diet CCHO 45g .  Pt po intake is inadequate prior to advance aeb 
pt with 25% po avg x 2 days per RN doc.  Will continue to monitor po intake as diet 
advances.  Consider oral supplement if pt po continues to be poor.  



Est Energy needs ABW 73 [id number] kcals (23-25 kcal/kgBW), Est Protein needs: 58-73 
gms/day (0.8-1.0 gm/kgBW r/t elev RFT). Will continue to monitor and reassess prn.



Labs:  BUN 45H, Creat 1.07H, Gluc 129H, Ca 8.4L, Alb 2.4L 



BM:  1 BM  per RN doc 



Skin:  Bs 15 mod risk, full details in RN wound care doc.  



PES:  Altered nutrition related lab values r.t current chronic medical condition aeb elev 
RFT hyperglcyemia, elev A1C

 nutrient needs r/t adiposity aeb pt`s high BMI of 33.5 kgm2



Comments 

1) diet advanced to soft, continue to monitor po intake, labs skin

2) refer to CDE on DC

3) continue current plan of care



Monitor Po intake >75% 

f/u 3-5 days

## 2020-06-29 VITALS — DIASTOLIC BLOOD PRESSURE: 70 MMHG | SYSTOLIC BLOOD PRESSURE: 140 MMHG

## 2020-06-29 VITALS — SYSTOLIC BLOOD PRESSURE: 136 MMHG | DIASTOLIC BLOOD PRESSURE: 55 MMHG

## 2020-06-29 VITALS — SYSTOLIC BLOOD PRESSURE: 134 MMHG | DIASTOLIC BLOOD PRESSURE: 70 MMHG

## 2020-06-29 VITALS — SYSTOLIC BLOOD PRESSURE: 130 MMHG | DIASTOLIC BLOOD PRESSURE: 59 MMHG

## 2020-06-29 LAB
ANION GAP SERPL CALCULATED.3IONS-SCNC: 5 MMOL/L (ref 5–15)
BUN SERPL-MCNC: 44 MG/DL (ref 7–18)
BUN/CREAT SERPL: 45.4
CALCIUM SERPL-MCNC: 8.3 MG/DL (ref 8.5–10.1)
CHLORIDE SERPL-SCNC: 105 MMOL/L (ref 98–107)
CO2 SERPL-SCNC: 27 MMOL/L (ref 21–32)
GLUCOSE SERPL-MCNC: 148 MG/DL (ref 74–106)
POTASSIUM SERPL-SCNC: 3.7 MMOL/L (ref 3.5–5.1)
SODIUM SERPL-SCNC: 137 MMOL/L (ref 136–145)

## 2020-06-29 RX ADMIN — HUMAN INSULIN SCH UNITS: 100 INJECTION, SOLUTION SUBCUTANEOUS at 16:40

## 2020-06-29 RX ADMIN — HUMAN INSULIN SCH UNITS: 100 INJECTION, SOLUTION SUBCUTANEOUS at 05:57

## 2020-06-29 RX ADMIN — HUMAN INSULIN SCH UNITS: 100 INJECTION, SOLUTION SUBCUTANEOUS at 22:14

## 2020-06-29 RX ADMIN — Medication SCH STRIP: at 16:40

## 2020-06-29 RX ADMIN — Medication SCH STRIP: at 05:55

## 2020-06-29 RX ADMIN — HYDROCODONE BITARTRATE AND ACETAMINOPHEN PRN TAB: 5; 325 TABLET ORAL at 01:55

## 2020-06-29 RX ADMIN — MEROPENEM SCH MLS/HR: 1 INJECTION, POWDER, FOR SOLUTION INTRAVENOUS at 01:54

## 2020-06-29 RX ADMIN — Medication SCH STRIP: at 11:20

## 2020-06-29 RX ADMIN — HUMAN INSULIN SCH UNITS: 100 INJECTION, SOLUTION SUBCUTANEOUS at 11:21

## 2020-06-29 RX ADMIN — Medication SCH STRIP: at 22:12

## 2020-06-29 NOTE — NUR
BLACKBURN

BLACKBURN CATHETER REMOVED PER MD ORDER. APPROXIMATELY 500 ML CLEAR YELLOW URINE EMPTIED AT D/C. 
PATIENT TOLERATED WELL. EDUCATED PATIENT ON INFORMING RN OF WHEN SHE URINATES. PATIENT 
VERBALIZED UNDERSTANDING

## 2020-06-29 NOTE — NUR
MD ROUNDS

DR DESEAN GANDHI DISCUSSING POC WITH PATIENT. NEW ORDERS RECEIVED/WILL CARRY OUT. WILL CONTINUE TO 
MONITOR

## 2020-06-30 VITALS — SYSTOLIC BLOOD PRESSURE: 149 MMHG | DIASTOLIC BLOOD PRESSURE: 61 MMHG

## 2020-06-30 VITALS — DIASTOLIC BLOOD PRESSURE: 70 MMHG | SYSTOLIC BLOOD PRESSURE: 144 MMHG

## 2020-06-30 VITALS — DIASTOLIC BLOOD PRESSURE: 63 MMHG | SYSTOLIC BLOOD PRESSURE: 136 MMHG

## 2020-06-30 VITALS — SYSTOLIC BLOOD PRESSURE: 142 MMHG | DIASTOLIC BLOOD PRESSURE: 61 MMHG

## 2020-06-30 RX ADMIN — Medication SCH STRIP: at 06:56

## 2020-06-30 RX ADMIN — HUMAN INSULIN SCH UNITS: 100 INJECTION, SOLUTION SUBCUTANEOUS at 13:19

## 2020-06-30 RX ADMIN — HUMAN INSULIN SCH UNITS: 100 INJECTION, SOLUTION SUBCUTANEOUS at 06:56

## 2020-06-30 RX ADMIN — Medication SCH STRIP: at 13:18

## 2020-06-30 NOTE — NUR
DISCHARGE:

PATIENT GIVEN ALL EDUCATION MATERIALS, IV REMOVED, MANUAL PRESSURE APPLIED. TELE RETURNED TO 
CARDIO UNIT.PATIENT TAKEN DOWN TO PRIVATE AUTO WITH ALL BELONGINGS, EVEN AND UNLABORED 
RESPIRATIONS, AND WITHOUT INCIDENCE.

## 2020-06-30 NOTE — NUR
OPENING SHIFT NOTE:

ARRIVED ON SHIFT REPORT RECEIVED. PATIENT RESTING IN BED, NO SIGNS OF DISTRESS. RESPIRATIONS 
EVEN AND UNLABORED. WILL CONTINUE TO MONITOR.

## 2020-09-16 ENCOUNTER — HOSPITAL ENCOUNTER (OUTPATIENT)
Dept: HOSPITAL 15 - LAB | Age: 85
Discharge: HOME | End: 2020-09-16
Attending: NURSE PRACTITIONER
Payer: MEDICARE

## 2020-09-16 DIAGNOSIS — E11.9: Primary | ICD-10-CM

## 2020-09-16 DIAGNOSIS — R60.9: ICD-10-CM

## 2020-09-16 DIAGNOSIS — E78.5: ICD-10-CM

## 2020-09-16 LAB
ALBUMIN SERPL-MCNC: 3.1 G/DL (ref 3.4–5)
ALP SERPL-CCNC: 75 U/L (ref 45–117)
ALT SERPL-CCNC: 16 U/L (ref 13–56)
ANION GAP SERPL CALCULATED.3IONS-SCNC: 6 MMOL/L (ref 5–15)
BILIRUB SERPL-MCNC: 0.4 MG/DL (ref 0.2–1)
BUN SERPL-MCNC: 20 MG/DL (ref 7–18)
BUN/CREAT SERPL: 17.4
CALCIUM SERPL-MCNC: 9.2 MG/DL (ref 8.5–10.1)
CHLORIDE SERPL-SCNC: 111 MMOL/L (ref 98–107)
CHOLEST SERPL-MCNC: 104 MG/DL (ref ?–200)
CO2 SERPL-SCNC: 24 MMOL/L (ref 21–32)
GLUCOSE SERPL-MCNC: 118 MG/DL (ref 74–106)
HCT VFR BLD AUTO: 33.7 % (ref 36–46)
HDLC SERPL-MCNC: 38 MG/DL (ref 40–59)
HGB BLD-MCNC: 11.1 G/DL (ref 12.2–16.2)
MCH RBC QN AUTO: 29.1 PG (ref 28–32)
MCV RBC AUTO: 88 FL (ref 80–100)
NRBC BLD QL AUTO: 0 %
POTASSIUM SERPL-SCNC: 3.8 MMOL/L (ref 3.5–5.1)
PROT SERPL-MCNC: 7.1 G/DL (ref 6.4–8.2)
SODIUM SERPL-SCNC: 141 MMOL/L (ref 136–145)
TRIGL SERPL-MCNC: 165 MG/DL (ref ?–150)

## 2020-09-16 PROCEDURE — 85025 COMPLETE CBC W/AUTO DIFF WBC: CPT

## 2020-09-16 PROCEDURE — 36415 COLL VENOUS BLD VENIPUNCTURE: CPT

## 2020-09-16 PROCEDURE — 82043 UR ALBUMIN QUANTITATIVE: CPT

## 2020-09-16 PROCEDURE — 80061 LIPID PANEL: CPT

## 2020-09-16 PROCEDURE — 80053 COMPREHEN METABOLIC PANEL: CPT

## 2020-09-16 PROCEDURE — 81001 URINALYSIS AUTO W/SCOPE: CPT

## 2020-09-16 PROCEDURE — 83036 HEMOGLOBIN GLYCOSYLATED A1C: CPT

## 2021-01-25 ENCOUNTER — HOSPITAL ENCOUNTER (OUTPATIENT)
Dept: HOSPITAL 15 - LAB | Age: 86
Discharge: HOME | End: 2021-01-25
Attending: NURSE PRACTITIONER
Payer: MEDICARE

## 2021-01-25 DIAGNOSIS — E78.5: ICD-10-CM

## 2021-01-25 DIAGNOSIS — E11.9: Primary | ICD-10-CM

## 2021-01-25 DIAGNOSIS — I10: ICD-10-CM

## 2021-01-25 LAB
ALBUMIN SERPL-MCNC: 3.1 G/DL (ref 3.4–5)
ALP SERPL-CCNC: 89 U/L (ref 45–117)
ALT SERPL-CCNC: 16 U/L (ref 13–56)
ANION GAP SERPL CALCULATED.3IONS-SCNC: 7 MMOL/L (ref 5–15)
BILIRUB SERPL-MCNC: 0.4 MG/DL (ref 0.2–1)
BUN SERPL-MCNC: 35 MG/DL (ref 7–18)
BUN/CREAT SERPL: 28.5
CALCIUM SERPL-MCNC: 9.2 MG/DL (ref 8.5–10.1)
CHLORIDE SERPL-SCNC: 111 MMOL/L (ref 98–107)
CHOLEST SERPL-MCNC: 99 MG/DL (ref ?–200)
CO2 SERPL-SCNC: 23 MMOL/L (ref 21–32)
GLUCOSE SERPL-MCNC: 134 MG/DL (ref 74–106)
HCT VFR BLD AUTO: 34.8 % (ref 36–46)
HDLC SERPL-MCNC: 48 MG/DL (ref 40–59)
HGB BLD-MCNC: 11.5 G/DL (ref 12.2–16.2)
MCH RBC QN AUTO: 29.6 PG (ref 28–32)
MCV RBC AUTO: 89.3 FL (ref 80–100)
NRBC BLD QL AUTO: 0 %
POTASSIUM SERPL-SCNC: 4 MMOL/L (ref 3.5–5.1)
PROT SERPL-MCNC: 7.2 G/DL (ref 6.4–8.2)
SODIUM SERPL-SCNC: 141 MMOL/L (ref 136–145)
TRIGL SERPL-MCNC: 124 MG/DL (ref ?–150)

## 2021-01-25 PROCEDURE — 85025 COMPLETE CBC W/AUTO DIFF WBC: CPT

## 2021-01-25 PROCEDURE — 81001 URINALYSIS AUTO W/SCOPE: CPT

## 2021-01-25 PROCEDURE — 82043 UR ALBUMIN QUANTITATIVE: CPT

## 2021-01-25 PROCEDURE — 36415 COLL VENOUS BLD VENIPUNCTURE: CPT

## 2021-01-25 PROCEDURE — 80053 COMPREHEN METABOLIC PANEL: CPT

## 2021-01-25 PROCEDURE — 80061 LIPID PANEL: CPT

## 2021-01-25 PROCEDURE — 83036 HEMOGLOBIN GLYCOSYLATED A1C: CPT

## 2022-04-05 ENCOUNTER — HOSPITAL ENCOUNTER (OUTPATIENT)
Dept: HOSPITAL 15 - LAB | Age: 87
Discharge: HOME | End: 2022-04-05
Attending: NURSE PRACTITIONER
Payer: MEDICARE

## 2022-04-05 DIAGNOSIS — E78.5: ICD-10-CM

## 2022-04-05 DIAGNOSIS — I10: ICD-10-CM

## 2022-04-05 DIAGNOSIS — E11.9: Primary | ICD-10-CM

## 2022-04-05 LAB
ALBUMIN SERPL-MCNC: 3.1 G/DL (ref 3.4–5)
ALP SERPL-CCNC: 98 U/L (ref 45–117)
ALT SERPL-CCNC: 17 U/L (ref 13–56)
ANION GAP SERPL CALCULATED.3IONS-SCNC: 5 MMOL/L (ref 5–15)
BILIRUB SERPL-MCNC: 0.4 MG/DL (ref 0.2–1)
BUN SERPL-MCNC: 23 MG/DL (ref 7–18)
BUN/CREAT SERPL: 18.7
CALCIUM SERPL-MCNC: 8.7 MG/DL (ref 8.5–10.1)
CHLORIDE SERPL-SCNC: 111 MMOL/L (ref 98–107)
CHOLEST SERPL-MCNC: 118 MG/DL (ref ?–200)
CO2 SERPL-SCNC: 24 MMOL/L (ref 21–32)
GLUCOSE SERPL-MCNC: 132 MG/DL (ref 74–106)
HCT VFR BLD AUTO: 33.6 % (ref 36–46)
HDLC SERPL-MCNC: 47 MG/DL (ref 40–59)
HGB BLD-MCNC: 11.4 G/DL (ref 12.2–16.2)
MCH RBC QN AUTO: 30 PG (ref 28–32)
MCV RBC AUTO: 88.7 FL (ref 80–100)
NRBC BLD QL AUTO: 0.2 %
POTASSIUM SERPL-SCNC: 3.8 MMOL/L (ref 3.5–5.1)
PROT SERPL-MCNC: 7 G/DL (ref 6.4–8.2)
SODIUM SERPL-SCNC: 140 MMOL/L (ref 136–145)
TRIGL SERPL-MCNC: 220 MG/DL (ref ?–150)

## 2022-04-05 PROCEDURE — 81003 URINALYSIS AUTO W/O SCOPE: CPT

## 2022-04-05 PROCEDURE — 85025 COMPLETE CBC W/AUTO DIFF WBC: CPT

## 2022-04-05 PROCEDURE — 80061 LIPID PANEL: CPT

## 2022-04-05 PROCEDURE — 80053 COMPREHEN METABOLIC PANEL: CPT

## 2022-04-05 PROCEDURE — 82043 UR ALBUMIN QUANTITATIVE: CPT

## 2022-04-05 PROCEDURE — 83036 HEMOGLOBIN GLYCOSYLATED A1C: CPT

## 2022-04-05 PROCEDURE — 36415 COLL VENOUS BLD VENIPUNCTURE: CPT

## 2022-10-05 ENCOUNTER — HOSPITAL ENCOUNTER (OUTPATIENT)
Dept: HOSPITAL 15 - LAB | Age: 87
Discharge: HOME | End: 2022-10-05
Attending: NURSE PRACTITIONER
Payer: MEDICARE

## 2022-10-05 DIAGNOSIS — I10: ICD-10-CM

## 2022-10-05 DIAGNOSIS — E78.5: Primary | ICD-10-CM

## 2022-10-05 LAB
ALBUMIN SERPL-MCNC: 3 G/DL (ref 3.4–5)
ALP SERPL-CCNC: 71 U/L (ref 45–117)
ALT SERPL-CCNC: 15 U/L (ref 13–56)
ANION GAP SERPL CALCULATED.3IONS-SCNC: 8 MMOL/L (ref 5–15)
BILIRUB SERPL-MCNC: 0.4 MG/DL (ref 0.2–1)
BUN SERPL-MCNC: 20 MG/DL (ref 7–18)
BUN/CREAT SERPL: 15.3
CALCIUM SERPL-MCNC: 8.6 MG/DL (ref 8.5–10.1)
CHLORIDE SERPL-SCNC: 113 MMOL/L (ref 98–107)
CHOLEST SERPL-MCNC: 104 MG/DL (ref ?–200)
CO2 SERPL-SCNC: 24 MMOL/L (ref 21–32)
GLUCOSE SERPL-MCNC: 119 MG/DL (ref 74–106)
HCT VFR BLD AUTO: 34 % (ref 36–46)
HDLC SERPL-MCNC: 48 MG/DL (ref 40–59)
HGB BLD-MCNC: 11.1 G/DL (ref 12.2–16.2)
MCH RBC QN AUTO: 29.3 PG (ref 28–32)
MCV RBC AUTO: 89.1 FL (ref 80–100)
NRBC BLD QL AUTO: 0.1 %
POTASSIUM SERPL-SCNC: 4 MMOL/L (ref 3.5–5.1)
PROT SERPL-MCNC: 6.4 G/DL (ref 6.4–8.2)
SODIUM SERPL-SCNC: 145 MMOL/L (ref 136–145)
TRIGL SERPL-MCNC: 148 MG/DL (ref ?–150)

## 2022-10-05 PROCEDURE — 81001 URINALYSIS AUTO W/SCOPE: CPT

## 2022-10-05 PROCEDURE — 36415 COLL VENOUS BLD VENIPUNCTURE: CPT

## 2022-10-05 PROCEDURE — 80061 LIPID PANEL: CPT

## 2022-10-05 PROCEDURE — 85025 COMPLETE CBC W/AUTO DIFF WBC: CPT

## 2022-10-05 PROCEDURE — 80053 COMPREHEN METABOLIC PANEL: CPT

## 2023-04-21 ENCOUNTER — HOSPITAL ENCOUNTER (EMERGENCY)
Dept: HOSPITAL 15 - ER | Age: 88
Discharge: HOME | End: 2023-04-21
Payer: MEDICARE

## 2023-04-21 VITALS — WEIGHT: 180.78 LBS | BODY MASS INDEX: 33.27 KG/M2 | HEIGHT: 62 IN

## 2023-04-21 VITALS — SYSTOLIC BLOOD PRESSURE: 163 MMHG | DIASTOLIC BLOOD PRESSURE: 66 MMHG

## 2023-04-21 DIAGNOSIS — Z90.710: ICD-10-CM

## 2023-04-21 DIAGNOSIS — Z79.899: ICD-10-CM

## 2023-04-21 DIAGNOSIS — Z90.49: ICD-10-CM

## 2023-04-21 DIAGNOSIS — Z88.0: ICD-10-CM

## 2023-04-21 DIAGNOSIS — I11.0: ICD-10-CM

## 2023-04-21 DIAGNOSIS — I50.9: ICD-10-CM

## 2023-04-21 DIAGNOSIS — E11.9: ICD-10-CM

## 2023-04-21 DIAGNOSIS — Z90.89: ICD-10-CM

## 2023-04-21 DIAGNOSIS — L03.313: Primary | ICD-10-CM

## 2023-04-21 DIAGNOSIS — E78.5: ICD-10-CM

## 2023-09-05 ENCOUNTER — HOSPITAL ENCOUNTER (OUTPATIENT)
Dept: HOSPITAL 15 - LAB | Age: 88
Discharge: HOME | End: 2023-09-05
Attending: NURSE PRACTITIONER
Payer: MEDICARE

## 2023-09-05 DIAGNOSIS — I10: Primary | ICD-10-CM

## 2023-09-05 DIAGNOSIS — E78.5: ICD-10-CM

## 2023-09-05 DIAGNOSIS — E11.9: ICD-10-CM

## 2023-09-05 LAB
ALBUMIN SERPL-MCNC: 3.9 G/DL (ref 3.2–4.8)
ALP SERPL-CCNC: 84 U/L (ref 46–116)
ALT SERPL-CCNC: 11 U/L (ref 7–40)
ANION GAP SERPL CALCULATED.3IONS-SCNC: 7 MMOL/L (ref 5–15)
BILIRUB SERPL-MCNC: 0.5 MG/DL (ref 0.2–1)
BUN SERPL-MCNC: 15 MG/DL (ref 9–23)
BUN/CREAT SERPL: 13 (ref 10–20)
CALCIUM SERPL-MCNC: 9 MG/DL (ref 8.5–10.1)
CHLORIDE SERPL-SCNC: 106 MMOL/L (ref 98–107)
CHOLEST SERPL-MCNC: 103 MG/DL (ref ?–200)
CO2 SERPL-SCNC: 27 MMOL/L (ref 20–30)
GLUCOSE SERPL-MCNC: 148 MG/DL (ref 74–106)
HCT VFR BLD AUTO: 33 % (ref 36–46)
HDLC SERPL-MCNC: 36 MG/DL (ref 40–59)
HGB BLD-MCNC: 11 G/DL (ref 12.2–16.2)
MCH RBC QN AUTO: 29.1 PG (ref 28–32)
MCV RBC AUTO: 87.3 FL (ref 80–100)
NRBC BLD QL AUTO: 0 %
POTASSIUM SERPL-SCNC: 3.4 MMOL/L (ref 3.5–5.1)
PROT SERPL-MCNC: 6.6 G/DL (ref 5.7–8.2)
PROT UR STRIP.AUTO-MCNC: (no result) MG/DL
SODIUM SERPL-SCNC: 140 MMOL/L (ref 136–145)
TRIGL SERPL-MCNC: 165 MG/DL (ref ?–150)

## 2023-09-05 PROCEDURE — 85025 COMPLETE CBC W/AUTO DIFF WBC: CPT

## 2023-09-05 PROCEDURE — 80061 LIPID PANEL: CPT

## 2023-09-05 PROCEDURE — 81001 URINALYSIS AUTO W/SCOPE: CPT

## 2023-09-05 PROCEDURE — 36415 COLL VENOUS BLD VENIPUNCTURE: CPT

## 2023-09-05 PROCEDURE — 80053 COMPREHEN METABOLIC PANEL: CPT

## 2023-09-05 PROCEDURE — 82043 UR ALBUMIN QUANTITATIVE: CPT

## 2023-09-05 PROCEDURE — 83036 HEMOGLOBIN GLYCOSYLATED A1C: CPT
